# Patient Record
Sex: MALE | Race: ASIAN | NOT HISPANIC OR LATINO | Employment: FULL TIME | URBAN - METROPOLITAN AREA
[De-identification: names, ages, dates, MRNs, and addresses within clinical notes are randomized per-mention and may not be internally consistent; named-entity substitution may affect disease eponyms.]

---

## 2023-06-25 DIAGNOSIS — E78.5 HYPERLIPIDEMIA, UNSPECIFIED: ICD-10-CM

## 2023-06-26 PROBLEM — R03.0 ELEVATED BLOOD PRESSURE READING: Status: ACTIVE | Noted: 2023-06-26

## 2023-06-26 PROBLEM — Z86.59 HISTORY OF OCD (OBSESSIVE COMPULSIVE DISORDER): Status: ACTIVE | Noted: 2023-06-26

## 2023-06-26 PROBLEM — R51.9 HEADACHE: Status: ACTIVE | Noted: 2023-06-26

## 2023-06-26 PROBLEM — E78.5 HYPERLIPIDEMIA: Status: ACTIVE | Noted: 2023-06-26

## 2023-06-26 PROBLEM — G44.84 EXERTIONAL HEADACHE: Status: ACTIVE | Noted: 2023-06-26

## 2023-06-26 PROBLEM — R68.84 JAW PAIN: Status: ACTIVE | Noted: 2023-06-26

## 2023-06-26 PROBLEM — G44.209 TENSION HEADACHE: Status: ACTIVE | Noted: 2023-06-26

## 2023-06-26 RX ORDER — SERTRALINE HYDROCHLORIDE 100 MG/1
100 TABLET, FILM COATED ORAL DAILY
COMMUNITY
End: 2023-10-23

## 2023-06-26 RX ORDER — AMITRIPTYLINE HYDROCHLORIDE 25 MG/1
1 TABLET, FILM COATED ORAL NIGHTLY
COMMUNITY
Start: 2021-10-19 | End: 2023-09-28 | Stop reason: ALTCHOICE

## 2023-06-26 RX ORDER — MULTIVITAMIN
TABLET ORAL
COMMUNITY

## 2023-06-26 RX ORDER — ATORVASTATIN CALCIUM 40 MG/1
40 TABLET, FILM COATED ORAL NIGHTLY
COMMUNITY
End: 2023-09-28 | Stop reason: ALTCHOICE

## 2023-06-27 RX ORDER — ATORVASTATIN CALCIUM 40 MG/1
TABLET, FILM COATED ORAL
Qty: 90 TABLET | Refills: 3 | Status: SHIPPED | OUTPATIENT
Start: 2023-06-27 | End: 2024-01-25 | Stop reason: SDUPTHER

## 2023-09-21 ENCOUNTER — TELEPHONE (OUTPATIENT)
Dept: PRIMARY CARE | Facility: CLINIC | Age: 38
End: 2023-09-21
Payer: COMMERCIAL

## 2023-09-21 NOTE — TELEPHONE ENCOUNTER
9/21/23 called left message that Dr. Coe wanted to see him tomorrow for appt, asked pt to call back to discuss times.

## 2023-09-22 NOTE — TELEPHONE ENCOUNTER
9/22/23 called little after 8am this morning left another message offered appt today with Dr. Coe.

## 2023-09-28 ENCOUNTER — OFFICE VISIT (OUTPATIENT)
Dept: PRIMARY CARE | Facility: CLINIC | Age: 38
End: 2023-09-28
Payer: COMMERCIAL

## 2023-09-28 VITALS
HEART RATE: 74 BPM | HEIGHT: 74 IN | TEMPERATURE: 97.4 F | SYSTOLIC BLOOD PRESSURE: 110 MMHG | BODY MASS INDEX: 30.16 KG/M2 | OXYGEN SATURATION: 98 % | DIASTOLIC BLOOD PRESSURE: 68 MMHG | WEIGHT: 235 LBS

## 2023-09-28 DIAGNOSIS — L05.91 PILONIDAL CYST: Primary | ICD-10-CM

## 2023-09-28 DIAGNOSIS — Z00.00 HEALTHCARE MAINTENANCE: ICD-10-CM

## 2023-09-28 PROBLEM — D48.5 NEOPLASM OF UNCERTAIN BEHAVIOR OF SKIN: Status: ACTIVE | Noted: 2023-05-24

## 2023-09-28 PROCEDURE — 90471 IMMUNIZATION ADMIN: CPT | Performed by: INTERNAL MEDICINE

## 2023-09-28 PROCEDURE — 99213 OFFICE O/P EST LOW 20 MIN: CPT | Performed by: INTERNAL MEDICINE

## 2023-09-28 PROCEDURE — 90686 IIV4 VACC NO PRSV 0.5 ML IM: CPT | Performed by: INTERNAL MEDICINE

## 2023-09-28 PROCEDURE — 1036F TOBACCO NON-USER: CPT | Performed by: INTERNAL MEDICINE

## 2023-09-28 RX ORDER — DOXYCYCLINE 100 MG/1
100 CAPSULE ORAL 2 TIMES DAILY
Qty: 20 CAPSULE | Refills: 0 | Status: SHIPPED | OUTPATIENT
Start: 2023-09-28 | End: 2023-10-08

## 2023-09-28 ASSESSMENT — PATIENT HEALTH QUESTIONNAIRE - PHQ9
2. FEELING DOWN, DEPRESSED OR HOPELESS: NOT AT ALL
1. LITTLE INTEREST OR PLEASURE IN DOING THINGS: NOT AT ALL
SUM OF ALL RESPONSES TO PHQ9 QUESTIONS 1 AND 2: 0

## 2023-09-28 NOTE — PROGRESS NOTES
"He is here for a cyst on top of his glutes    Subjective   Patient ID: Avni Griggs is a 38 y.o. male who presents for Cyst.    HPI     Here for cyst on buttock region  Noticed last week  Has had a pilonidal cyst in past and treated with antibiotics  No fevers or chills  Pressure on buttocks and painful  Review of Systems   All other systems reviewed and are negative.      Objective   /68 (BP Location: Left arm, Patient Position: Sitting)   Pulse 74   Temp 36.3 °C (97.4 °F)   Ht 1.88 m (6' 2\")   Wt 107 kg (235 lb)   SpO2 98%   BMI 30.17 kg/m²     Physical Exam  Skin:     Comments: Pain on coccyx  Cyst felt  No erythema or warmth         Assessment/Plan   Problem List Items Addressed This Visit    None  Visit Diagnoses         Codes    Pilonidal cyst    -  Primary L05.91    Relevant Medications    doxycycline (Vibramycin) 100 mg capsule    Healthcare maintenance     Z00.00    Relevant Orders    Flu vaccine (IIV4) age 6 months and greater, preservative free (Completed)    Comprehensive Metabolic Panel    Lipid Panel    CBC          Pilonidal cyst  -doxy to see if will calm down  -if not-send to surgery  -advised doxy can cause sunburn    Flu shot given  Labs ordered as overdue     "

## 2023-10-23 DIAGNOSIS — Z86.59 PERSONAL HISTORY OF OTHER MENTAL AND BEHAVIORAL DISORDERS: ICD-10-CM

## 2023-10-23 RX ORDER — SERTRALINE HYDROCHLORIDE 100 MG/1
100 TABLET, FILM COATED ORAL DAILY
Qty: 90 TABLET | Refills: 0 | Status: SHIPPED | OUTPATIENT
Start: 2023-10-23 | End: 2024-01-18

## 2024-01-18 DIAGNOSIS — Z86.59 PERSONAL HISTORY OF OTHER MENTAL AND BEHAVIORAL DISORDERS: ICD-10-CM

## 2024-01-18 RX ORDER — SERTRALINE HYDROCHLORIDE 100 MG/1
100 TABLET, FILM COATED ORAL DAILY
Qty: 90 TABLET | Refills: 0 | Status: SHIPPED | OUTPATIENT
Start: 2024-01-18 | End: 2024-01-25 | Stop reason: SDUPTHER

## 2024-01-25 ENCOUNTER — OFFICE VISIT (OUTPATIENT)
Dept: PRIMARY CARE | Facility: CLINIC | Age: 39
End: 2024-01-25
Payer: COMMERCIAL

## 2024-01-25 VITALS
SYSTOLIC BLOOD PRESSURE: 110 MMHG | HEART RATE: 58 BPM | WEIGHT: 235.9 LBS | BODY MASS INDEX: 29.33 KG/M2 | RESPIRATION RATE: 16 BRPM | OXYGEN SATURATION: 98 % | TEMPERATURE: 97.5 F | DIASTOLIC BLOOD PRESSURE: 80 MMHG | HEIGHT: 75 IN

## 2024-01-25 DIAGNOSIS — E78.5 HYPERLIPIDEMIA, UNSPECIFIED: ICD-10-CM

## 2024-01-25 DIAGNOSIS — Z86.59 PERSONAL HISTORY OF OTHER MENTAL AND BEHAVIORAL DISORDERS: ICD-10-CM

## 2024-01-25 DIAGNOSIS — Z00.00 ROUTINE HEALTH MAINTENANCE: Primary | ICD-10-CM

## 2024-01-25 PROBLEM — R68.84 JAW PAIN: Status: RESOLVED | Noted: 2023-06-26 | Resolved: 2024-01-25

## 2024-01-25 PROBLEM — R51.9 HEADACHE: Status: RESOLVED | Noted: 2023-06-26 | Resolved: 2024-01-25

## 2024-01-25 PROCEDURE — 1036F TOBACCO NON-USER: CPT | Performed by: INTERNAL MEDICINE

## 2024-01-25 PROCEDURE — 99395 PREV VISIT EST AGE 18-39: CPT | Performed by: INTERNAL MEDICINE

## 2024-01-25 RX ORDER — ATORVASTATIN CALCIUM 40 MG/1
40 TABLET, FILM COATED ORAL NIGHTLY
Qty: 90 TABLET | Refills: 3 | Status: SHIPPED | OUTPATIENT
Start: 2024-01-25

## 2024-01-25 RX ORDER — SERTRALINE HYDROCHLORIDE 100 MG/1
100 TABLET, FILM COATED ORAL DAILY
Qty: 90 TABLET | Refills: 3 | Status: SHIPPED | OUTPATIENT
Start: 2024-01-25

## 2024-01-25 ASSESSMENT — ENCOUNTER SYMPTOMS
POLYDIPSIA: 0
UNEXPECTED WEIGHT CHANGE: 0
WHEEZING: 0
RHINORRHEA: 0
CHEST TIGHTNESS: 0
EYE PAIN: 0
FEVER: 0
DIARRHEA: 0
SHORTNESS OF BREATH: 0
DIZZINESS: 0
ABDOMINAL PAIN: 0
COUGH: 0
NERVOUS/ANXIOUS: 0
SORE THROAT: 0
CHILLS: 0
DYSURIA: 0
VOMITING: 0
HEMATURIA: 0
CONSTIPATION: 0
NAUSEA: 0
PALPITATIONS: 0
MYALGIAS: 0
POLYPHAGIA: 0
FREQUENCY: 0
BLOOD IN STOOL: 0
DYSPHORIC MOOD: 0
ARTHRALGIAS: 0
HEADACHES: 0
WOUND: 0

## 2024-01-25 ASSESSMENT — PATIENT HEALTH QUESTIONNAIRE - PHQ9
SUM OF ALL RESPONSES TO PHQ9 QUESTIONS 1 AND 2: 0
2. FEELING DOWN, DEPRESSED OR HOPELESS: NOT AT ALL
1. LITTLE INTEREST OR PLEASURE IN DOING THINGS: NOT AT ALL

## 2024-01-25 NOTE — PROGRESS NOTES
"Subjective   Patient ID: Avni Griggs is a 38 y.o. male who presents for Annual Exam and medication follow up.    HPI     Dental visits- UTD  Eye visits-1 year ago    Exercise- 3-4x a week (peloton)  Diet- good    Alcohol use- 1x per week if that  Smoking- none    He has some postnasal drip  Has tried lymphatic massage and helped. Mucinex   Had an ear infection    Did not do labs but will      Review of Systems   Constitutional:  Negative for chills, fever and unexpected weight change.   HENT:  Negative for congestion, hearing loss, rhinorrhea and sore throat.    Eyes:  Negative for pain and visual disturbance.   Respiratory:  Negative for cough, chest tightness, shortness of breath and wheezing.    Cardiovascular:  Negative for chest pain and palpitations.   Gastrointestinal:  Negative for abdominal pain, blood in stool, constipation, diarrhea, nausea and vomiting.   Endocrine: Negative for cold intolerance, heat intolerance, polydipsia and polyphagia.   Genitourinary:  Negative for dysuria, frequency and hematuria.   Musculoskeletal:  Negative for arthralgias and myalgias.   Skin:  Negative for rash and wound.   Neurological:  Negative for dizziness, syncope and headaches.   Psychiatric/Behavioral:  Negative for dysphoric mood. The patient is not nervous/anxious.        Objective   /80   Pulse 58   Temp 36.4 °C (97.5 °F)   Resp 16   Ht 1.892 m (6' 2.5\")   Wt 107 kg (235 lb 14.4 oz)   SpO2 98%   BMI 29.88 kg/m²     Physical Exam  Vitals reviewed.   Constitutional:       Appearance: Normal appearance. He is not ill-appearing.   HENT:      Head: Normocephalic and atraumatic.      Right Ear: Tympanic membrane normal.      Left Ear: Tympanic membrane normal.      Nose: Nose normal.      Mouth/Throat:      Mouth: Mucous membranes are moist.      Pharynx: Oropharynx is clear.   Eyes:      Extraocular Movements: Extraocular movements intact.      Conjunctiva/sclera: Conjunctivae normal.      Pupils: Pupils " are equal, round, and reactive to light.   Cardiovascular:      Rate and Rhythm: Normal rate and regular rhythm.   Pulmonary:      Effort: Pulmonary effort is normal.      Breath sounds: Normal breath sounds. No wheezing.   Abdominal:      General: There is no distension.      Palpations: Abdomen is soft. There is no mass.      Tenderness: There is no abdominal tenderness.   Musculoskeletal:         General: No swelling.      Cervical back: Neck supple.   Lymphadenopathy:      Cervical: No cervical adenopathy.   Neurological:      General: No focal deficit present.      Mental Status: He is alert and oriented to person, place, and time.      Gait: Gait normal.   Psychiatric:         Mood and Affect: Mood normal.         Behavior: Behavior normal.         Thought Content: Thought content normal.         Assessment/Plan   Problem List Items Addressed This Visit    None  Visit Diagnoses         Codes    Routine health maintenance    -  Primary Z00.00    Hyperlipidemia, unspecified     E78.5    Relevant Medications    atorvastatin (Lipitor) 40 mg tablet    Personal history of other mental and behavioral disorders     Z86.59    Relevant Medications    sertraline (Zoloft) 100 mg tablet             CPE completed.  Advised to keep a heart healthy, low fat diet with fruits and veggies like Mediterranean diet.  Advised on the importance of exercise and maintaining 150 minutes of exercise per week (30 minutes per day 5 days a week).  Advised on regular eye and dental visits.  Discussed age appropriate cancer screening, immunizations and recommendations given.  Discussed avoiding illicit drugs and tobacco. Advised on appropriate use of alcohol.  Advised to wear seat belt.     Exertional headaches: resolved   -get very occasional now  -MRA neg     Hyperlipidemia: runs in fam  -on lipitor.     PND-flonase and claritin prn  -call if fdoes not go away     Left shoulder pain: advised to try PT exercises on youtube for rotator cuff      OCD: on sertraline, sees psych. Advised to get labs     UTD tdap, and flu. Advised COVID booster.   to Prabha. Works for [employer]  CPE 1 year

## 2024-08-19 ENCOUNTER — TELEMEDICINE (OUTPATIENT)
Dept: PRIMARY CARE | Facility: CLINIC | Age: 39
End: 2024-08-19
Payer: COMMERCIAL

## 2024-08-19 DIAGNOSIS — U07.1 COVID: Primary | ICD-10-CM

## 2024-08-19 PROCEDURE — 99213 OFFICE O/P EST LOW 20 MIN: CPT | Performed by: NURSE PRACTITIONER

## 2024-08-19 NOTE — PROGRESS NOTES
Subjective   Patient ID: Avni Griggs is a 39 y.o. male who presents for No chief complaint on file..    Patient of Dr. Coe.    Follow up COVID.  Lost smell, taste, body aches.  Started to become ill on Tuesday last week (8/13)  He feels a bit short of breath with exercise and activity.  Still with a little congestion in chest and nose.   Still gets burning with coughing but cough is better.  Overall he feels much better but with his kids going back to school he wanted to mke sure he was ok to be around others and send them back.           Review of Systems  otherwise negative aside from what was mentioned above in HPI.    Objective   There were no vitals taken for this visit.    Physical Exam  Constitutional:       Appearance: He is not ill-appearing.   Neurological:      Mental Status: He is alert.   Psychiatric:         Mood and Affect: Mood normal.         Behavior: Behavior normal.         Thought Content: Thought content normal.         Judgment: Judgment normal.         Assessment/Plan   Problem List Items Addressed This Visit             ICD-10-CM    COVID - Primary U07.1   Plan:  -Drink plenty of fluids and get plenty of rest. If you develop SOB, chest pain, dizziness, or severe headache call for follow up or go directly to ER.     -Monitor your health and practice social distancing. Social distancing been staying out of crowded places, avoiding group gatherings, and maintaining distance (approximately 6 feet) from others when possible until symptoms are getting better and you have not had a fever for more than 24 hours without fever reducing medications.     -If you get sick with fever (100.4°F/38°C or higher), cough, or have trouble breathing; call our office back or call the emergency room. Discussed the importance of calling ahead to prevent the spread of infection to others.    -Avoid contact with others.    -Do not travel while sick.    -Wash her hands often with soap and water for at least 20  seconds. If soap and water are not readily available, and use an alcohol-based hand  that contains at least 60% alcohol. Soap and water should be used if hands are visibly dirty.

## 2025-02-25 ENCOUNTER — PATIENT MESSAGE (OUTPATIENT)
Dept: PRIMARY CARE | Facility: CLINIC | Age: 40
End: 2025-02-25
Payer: COMMERCIAL

## 2025-02-25 DIAGNOSIS — L05.91 PILONIDAL CYST: Primary | ICD-10-CM

## 2025-03-05 ENCOUNTER — OFFICE VISIT (OUTPATIENT)
Dept: SURGERY | Facility: CLINIC | Age: 40
End: 2025-03-05
Payer: COMMERCIAL

## 2025-03-05 VITALS
BODY MASS INDEX: 31.58 KG/M2 | HEART RATE: 56 BPM | WEIGHT: 246.1 LBS | SYSTOLIC BLOOD PRESSURE: 116 MMHG | HEIGHT: 74 IN | RESPIRATION RATE: 16 BRPM | DIASTOLIC BLOOD PRESSURE: 73 MMHG

## 2025-03-05 ASSESSMENT — PAIN SCALES - GENERAL: PAINLEVEL_OUTOF10: 2

## 2025-03-10 NOTE — PROGRESS NOTES
HISTORY OF PRESENTING ILLNESS: Avni Griggs is a 39yoM with c/o pilonidal cyst.  He has had this issue for over 20 years.  He has never formally had an infection with abscess and drainage.  He has had occasions where his pain has gotten significantly worse at which time he has tried antibiotics.  Antibiotics seem to help a bit but there is constant mild pain with extended sitting.  Never had any surgical treatment of this in the past.  Denies any fevers or chills.  No changes in bowel function.      Past Medical History:   Diagnosis Date    OCD (obsessive compulsive disorder)          Past Surgical History:   Procedure Laterality Date    MR HEAD ANGIO WO IV CONTRAST  10/18/2021    MR HEAD ANGIO WO IV CONTRAST 10/18/2021 [facility]    MR NECK ANGIO WO IV CONTRAST  10/18/2021    MR NECK ANGIO WO IV CONTRAST 10/18/2021 [facility]    OTHER SURGICAL HISTORY  03/12/2021    No history of surgery         Social History     Tobacco Use    Smoking status: Never    Smokeless tobacco: Never   Substance Use Topics    Alcohol use: Yes     Comment: occasional    Drug use: Never         No family history on file.        Current Outpatient Medications:     atorvastatin (Lipitor) 40 mg tablet, Take 1 tablet (40 mg) by mouth once daily at bedtime., Disp: 90 tablet, Rfl: 3    multivitamin (Multiple Vitamins) tablet, Take by mouth., Disp: , Rfl:     sertraline (Zoloft) 100 mg tablet, Take 1 tablet (100 mg) by mouth once daily., Disp: 90 tablet, Rfl: 3       No Known Allergies      REVIEW OF SYSTEMS: As stated in the HPI above. All other systems negative.    Review of Systems    Labs:   @RESUFAST (ALBUMIN, BMP, CBC, CEA, CMP, HGBA1C, PATHREP)@    Imaging:  No results found.     Physical Exam:  Physical Exam  Constitutional:       Appearance: Normal appearance.   Genitourinary:     Comments: Area overlying the coccyx there is hair. There are very small pits. No erythema. No scaring. No wounds. No induration. No  drainage.  Neurological:      General: No focal deficit present.      Mental Status: He is alert and oriented to person, place, and time.   Psychiatric:         Mood and Affect: Mood normal.         Behavior: Behavior normal.       ASSESSMENT AND PLAN: 39 y.o male with non-infected pilonidal cyst. Will try laser hair removal to see if this can help with the recurrent inflammation. If no success then we can consider resection with flap coverage with plastics      MD Cyndi Cash RN   3/10/2025

## 2025-03-12 ENCOUNTER — APPOINTMENT (OUTPATIENT)
Dept: SURGERY | Facility: CLINIC | Age: 40
End: 2025-03-12
Payer: COMMERCIAL

## 2025-03-12 VITALS
BODY MASS INDEX: 31.84 KG/M2 | WEIGHT: 248 LBS | RESPIRATION RATE: 20 BRPM | SYSTOLIC BLOOD PRESSURE: 125 MMHG | DIASTOLIC BLOOD PRESSURE: 80 MMHG

## 2025-03-12 DIAGNOSIS — L05.91 PILONIDAL CYST: Primary | ICD-10-CM

## 2025-03-12 PROCEDURE — 99204 OFFICE O/P NEW MOD 45 MIN: CPT | Performed by: COLON & RECTAL SURGERY

## 2025-03-12 ASSESSMENT — PAIN SCALES - GENERAL: PAINLEVEL_OUTOF10: 0-NO PAIN

## 2025-03-18 DIAGNOSIS — Z86.59 PERSONAL HISTORY OF OTHER MENTAL AND BEHAVIORAL DISORDERS: ICD-10-CM

## 2025-03-18 RX ORDER — SERTRALINE HYDROCHLORIDE 100 MG/1
100 TABLET, FILM COATED ORAL DAILY
Qty: 90 TABLET | Refills: 0 | Status: SHIPPED | OUTPATIENT
Start: 2025-03-18

## 2025-03-19 DIAGNOSIS — E78.5 HYPERLIPIDEMIA, UNSPECIFIED: ICD-10-CM

## 2025-03-19 RX ORDER — ATORVASTATIN CALCIUM 40 MG/1
40 TABLET, FILM COATED ORAL NIGHTLY
Qty: 90 TABLET | Refills: 3 | Status: SHIPPED | OUTPATIENT
Start: 2025-03-19

## 2025-04-01 ENCOUNTER — APPOINTMENT (OUTPATIENT)
Dept: SURGERY | Facility: CLINIC | Age: 40
End: 2025-04-01
Payer: COMMERCIAL

## 2025-06-09 ENCOUNTER — TELEPHONE (OUTPATIENT)
Dept: PRIMARY CARE | Facility: CLINIC | Age: 40
End: 2025-06-09
Payer: COMMERCIAL

## 2025-06-24 DIAGNOSIS — Z86.59 PERSONAL HISTORY OF OTHER MENTAL AND BEHAVIORAL DISORDERS: ICD-10-CM

## 2025-06-24 RX ORDER — SERTRALINE HYDROCHLORIDE 100 MG/1
100 TABLET, FILM COATED ORAL DAILY
Qty: 90 TABLET | Refills: 0 | Status: SHIPPED | OUTPATIENT
Start: 2025-06-24

## 2025-07-29 ENCOUNTER — APPOINTMENT (OUTPATIENT)
Dept: PRIMARY CARE | Facility: CLINIC | Age: 40
End: 2025-07-29
Payer: COMMERCIAL

## 2025-07-29 VITALS
BODY MASS INDEX: 30.18 KG/M2 | HEIGHT: 74 IN | SYSTOLIC BLOOD PRESSURE: 124 MMHG | DIASTOLIC BLOOD PRESSURE: 72 MMHG | OXYGEN SATURATION: 97 % | WEIGHT: 235.2 LBS | HEART RATE: 62 BPM

## 2025-07-29 DIAGNOSIS — E78.5 HYPERLIPIDEMIA, UNSPECIFIED: ICD-10-CM

## 2025-07-29 DIAGNOSIS — E78.5 HYPERLIPIDEMIA, UNSPECIFIED HYPERLIPIDEMIA TYPE: ICD-10-CM

## 2025-07-29 DIAGNOSIS — Z00.00 ROUTINE HEALTH MAINTENANCE: Primary | ICD-10-CM

## 2025-07-29 DIAGNOSIS — Z86.59 PERSONAL HISTORY OF OTHER MENTAL AND BEHAVIORAL DISORDERS: ICD-10-CM

## 2025-07-29 PROBLEM — R03.0 ELEVATED BLOOD PRESSURE READING: Status: RESOLVED | Noted: 2023-06-26 | Resolved: 2025-07-29

## 2025-07-29 PROBLEM — U07.1 COVID: Status: RESOLVED | Noted: 2024-08-19 | Resolved: 2025-07-29

## 2025-07-29 PROBLEM — D48.5 NEOPLASM OF UNCERTAIN BEHAVIOR OF SKIN: Status: RESOLVED | Noted: 2023-05-24 | Resolved: 2025-07-29

## 2025-07-29 PROCEDURE — 90715 TDAP VACCINE 7 YRS/> IM: CPT | Performed by: INTERNAL MEDICINE

## 2025-07-29 PROCEDURE — 1036F TOBACCO NON-USER: CPT | Performed by: INTERNAL MEDICINE

## 2025-07-29 PROCEDURE — 90471 IMMUNIZATION ADMIN: CPT | Performed by: INTERNAL MEDICINE

## 2025-07-29 PROCEDURE — 99395 PREV VISIT EST AGE 18-39: CPT | Performed by: INTERNAL MEDICINE

## 2025-07-29 PROCEDURE — 3008F BODY MASS INDEX DOCD: CPT | Performed by: INTERNAL MEDICINE

## 2025-07-29 RX ORDER — ATORVASTATIN CALCIUM 40 MG/1
40 TABLET, FILM COATED ORAL NIGHTLY
Qty: 90 TABLET | Refills: 3 | Status: SHIPPED | OUTPATIENT
Start: 2025-07-29

## 2025-07-29 RX ORDER — SERTRALINE HYDROCHLORIDE 100 MG/1
100 TABLET, FILM COATED ORAL DAILY
Qty: 90 TABLET | Refills: 3 | Status: SHIPPED | OUTPATIENT
Start: 2025-07-29

## 2025-07-29 ASSESSMENT — ENCOUNTER SYMPTOMS
WOUND: 0
MYALGIAS: 0
HEMATURIA: 0
PALPITATIONS: 0
COUGH: 0
DIZZINESS: 0
CHEST TIGHTNESS: 0
NAUSEA: 0
SORE THROAT: 0
VOMITING: 0
SHORTNESS OF BREATH: 0
BLOOD IN STOOL: 0
RHINORRHEA: 0
CHILLS: 0
UNEXPECTED WEIGHT CHANGE: 0
DYSPHORIC MOOD: 0
CONSTIPATION: 0
POLYPHAGIA: 0
FREQUENCY: 0
DIARRHEA: 0
POLYDIPSIA: 0
ARTHRALGIAS: 0
FEVER: 0
DYSURIA: 0
HEADACHES: 0
EYE PAIN: 0
WHEEZING: 0
ABDOMINAL PAIN: 0
NERVOUS/ANXIOUS: 0

## 2025-07-29 ASSESSMENT — PATIENT HEALTH QUESTIONNAIRE - PHQ9
SUM OF ALL RESPONSES TO PHQ9 QUESTIONS 1 AND 2: 0
1. LITTLE INTEREST OR PLEASURE IN DOING THINGS: NOT AT ALL
2. FEELING DOWN, DEPRESSED OR HOPELESS: NOT AT ALL

## 2025-07-29 NOTE — PROGRESS NOTES
"Subjective   Patient ID: Avni Griggs is a 40 y.o. male who presents for Annual Exam and Follow-up (medication).    HPI     Dental visits- UTD  Eye visits- UTD    Exercise- mix of cardio and strength  Diet-has improved    Alcohol use-very occasional  Smoking- none    Started tirzepatide through [employer] online. Feels like has helped appetite  Has lost weight  No side effects      Review of Systems   Constitutional:  Negative for chills, fever and unexpected weight change.   HENT:  Negative for congestion, hearing loss, rhinorrhea and sore throat.    Eyes:  Negative for pain and visual disturbance.   Respiratory:  Negative for cough, chest tightness, shortness of breath and wheezing.    Cardiovascular:  Negative for chest pain and palpitations.   Gastrointestinal:  Negative for abdominal pain, blood in stool, constipation, diarrhea, nausea and vomiting.   Endocrine: Negative for cold intolerance, heat intolerance, polydipsia and polyphagia.   Genitourinary:  Negative for dysuria, frequency and hematuria.   Musculoskeletal:  Negative for arthralgias and myalgias.   Skin:  Negative for rash and wound.   Neurological:  Negative for dizziness, syncope and headaches.   Psychiatric/Behavioral:  Negative for dysphoric mood. The patient is not nervous/anxious.        Objective   /72 (BP Location: Left arm, Patient Position: Sitting, BP Cuff Size: Adult)   Pulse 62   Ht 1.885 m (6' 2.21\")   Wt 107 kg (235 lb 3.2 oz)   SpO2 97%   BMI 30.02 kg/m²     Physical Exam  Vitals reviewed.   Constitutional:       Appearance: Normal appearance. He is not ill-appearing.   HENT:      Head: Normocephalic and atraumatic.      Right Ear: Tympanic membrane normal.      Left Ear: Tympanic membrane normal.      Nose: Nose normal.      Mouth/Throat:      Mouth: Mucous membranes are moist.      Pharynx: Oropharynx is clear.     Eyes:      Extraocular Movements: Extraocular movements intact.      Conjunctiva/sclera: Conjunctivae normal. "      Pupils: Pupils are equal, round, and reactive to light.       Cardiovascular:      Rate and Rhythm: Normal rate and regular rhythm.   Pulmonary:      Effort: Pulmonary effort is normal.      Breath sounds: Normal breath sounds. No wheezing.   Abdominal:      General: There is no distension.      Palpations: Abdomen is soft. There is no mass.      Tenderness: There is no abdominal tenderness.     Musculoskeletal:      Cervical back: Neck supple.      Right lower leg: No edema.      Left lower leg: No edema.   Lymphadenopathy:      Cervical: No cervical adenopathy.     Neurological:      General: No focal deficit present.      Mental Status: He is alert and oriented to person, place, and time.      Gait: Gait normal.     Psychiatric:         Mood and Affect: Mood normal.         Behavior: Behavior normal.         Thought Content: Thought content normal.         Assessment/Plan   Problem List Items Addressed This Visit           ICD-10-CM    Hyperlipidemia E78.5    Relevant Medications    atorvastatin (Lipitor) 40 mg tablet    Other Relevant Orders    Lipid Panel    Lipoprotein A (LPA)     Other Visit Diagnoses         Codes      Routine health maintenance    -  Primary Z00.00    Relevant Orders    Tdap vaccine, age 7 years and older  (BOOSTRIX)      Hyperlipidemia, unspecified     E78.5    Relevant Medications    atorvastatin (Lipitor) 40 mg tablet    Other Relevant Orders    Lipid Panel    Lipoprotein A (LPA)      Personal history of other mental and behavioral disorders     Z86.59    Relevant Medications    sertraline (Zoloft) 100 mg tablet             CPE completed.  Advised to keep a heart healthy, low fat diet with fruits and veggies like Mediterranean diet.  Advised on the importance of exercise and maintaining 150 minutes of exercise per week (30 minutes per day 5 days a week).  Advised on regular eye and dental visits.  Discussed age appropriate cancer screening, immunizations and recommendations  given.  Discussed avoiding illicit drugs and tobacco. Advised on appropriate use of alcohol.  Advised to wear seat belt.     Exertional headaches: resolved   -get very occasional now  -MRA neg     Hyperlipidemia: runs in fam  -on lipitor.   -LDL-103-- repeat 6 months, has made a lot of changes with oatmeal, flaxseeds, marquita seeds, more veggies    Obesity-on tirzepatide      OCD: on sertraline     Tdap today   to Prabha. Works for [employer]  CPE 1 year